# Patient Record
Sex: MALE | Race: WHITE | NOT HISPANIC OR LATINO | URBAN - METROPOLITAN AREA
[De-identification: names, ages, dates, MRNs, and addresses within clinical notes are randomized per-mention and may not be internally consistent; named-entity substitution may affect disease eponyms.]

---

## 2021-02-24 ENCOUNTER — IMPORTED ENCOUNTER (OUTPATIENT)
Dept: URBAN - METROPOLITAN AREA CLINIC 1 | Facility: CLINIC | Age: 66
End: 2021-02-24

## 2021-02-24 PROBLEM — H52.03: Noted: 2021-02-24

## 2021-02-24 PROBLEM — H52.4: Noted: 2021-02-24

## 2021-02-24 PROCEDURE — S0620 ROUTINE OPHTHALMOLOGICAL EXA: HCPCS

## 2021-02-24 NOTE — PATIENT DISCUSSION
1.  Hyperopia OU: Rx was given for corrective spectacles if indicated. 2.  Presbyopia OU3. Cataract OU - Observe. Return for an appointment in 1 year 36 with Dr. Annie Briggs.

## 2022-03-12 ASSESSMENT — KERATOMETRY
OD_AXISANGLE2_DEGREES: 080
OD_K1POWER_DIOPTERS: 43.25
OS_K2POWER_DIOPTERS: 44.25
OS_AXISANGLE2_DEGREES: 071
OD_K2POWER_DIOPTERS: 43.75
OD_AXISANGLE_DEGREES: 170
OS_K1POWER_DIOPTERS: 43.00
OS_AXISANGLE_DEGREES: 161

## 2022-03-12 ASSESSMENT — TONOMETRY
OS_IOP_MMHG: 12
OD_IOP_MMHG: 13

## 2022-03-12 ASSESSMENT — VISUAL ACUITY
OS_CC: J1
OS_SC: 20/20
OD_CC: J1
OD_SC: 20/20

## 2023-05-31 ENCOUNTER — COMPREHENSIVE EXAM (OUTPATIENT)
Dept: URBAN - METROPOLITAN AREA CLINIC 1 | Facility: CLINIC | Age: 68
End: 2023-05-31

## 2023-05-31 DIAGNOSIS — H52.4: ICD-10-CM

## 2023-05-31 DIAGNOSIS — Z01.00: ICD-10-CM

## 2023-05-31 PROCEDURE — 92014 COMPRE OPH EXAM EST PT 1/>: CPT

## 2023-05-31 PROCEDURE — 92015 DETERMINE REFRACTIVE STATE: CPT

## 2023-05-31 ASSESSMENT — KERATOMETRY
OD_K1POWER_DIOPTERS: 43.25
OD_K2POWER_DIOPTERS: 43.75
OS_K1POWER_DIOPTERS: 43.00
OS_AXISANGLE_DEGREES: 161
OS_K2POWER_DIOPTERS: 44.25
OD_AXISANGLE_DEGREES: 170
OS_AXISANGLE2_DEGREES: 071
OD_AXISANGLE2_DEGREES: 080

## 2023-05-31 ASSESSMENT — TONOMETRY
OS_IOP_MMHG: 14
OD_IOP_MMHG: 14

## 2023-05-31 ASSESSMENT — VISUAL ACUITY
OD_CC: 20/20
OS_CC: 20/20
OU_CC: J1+

## 2024-12-13 ENCOUNTER — COMPREHENSIVE EXAM (OUTPATIENT)
Age: 69
End: 2024-12-13

## 2024-12-13 DIAGNOSIS — H52.4: ICD-10-CM

## 2024-12-13 DIAGNOSIS — Z01.00: ICD-10-CM

## 2024-12-13 DIAGNOSIS — H52.03: ICD-10-CM

## 2024-12-13 DIAGNOSIS — H52.223: ICD-10-CM

## 2024-12-13 PROCEDURE — 92014 COMPRE OPH EXAM EST PT 1/>: CPT

## 2024-12-13 PROCEDURE — 92015 DETERMINE REFRACTIVE STATE: CPT
